# Patient Record
Sex: MALE | Race: WHITE
[De-identification: names, ages, dates, MRNs, and addresses within clinical notes are randomized per-mention and may not be internally consistent; named-entity substitution may affect disease eponyms.]

---

## 2017-12-09 ENCOUNTER — HOSPITAL ENCOUNTER (EMERGENCY)
Dept: HOSPITAL 80 - FED | Age: 42
LOS: 2 days | Discharge: HOME | End: 2017-12-11
Payer: MEDICAID

## 2017-12-09 DIAGNOSIS — F19.10: Primary | ICD-10-CM

## 2017-12-09 DIAGNOSIS — F17.200: ICD-10-CM

## 2017-12-09 PROCEDURE — G0480 DRUG TEST DEF 1-7 CLASSES: HCPCS

## 2017-12-09 NOTE — EDPHY
General





- History


Smoking Status: Current some day smoker


Narrative: 





0641AM:  No acute overnight.  Patient was sling.  Needs mental evaluation.  Was 

positive for methamphetamine.  Underlying schizophrenia. (Almas Pierre)





I assumed care of the patient at 0700.  The patient remained stable throughout 

my shift.  He was seen by Mental Health who currently is evaluating possible 

placement options.  The patient will be turned over to Dr. Pagan at shift 

change.





 (Kofi Pabon)





CHIEF COMPLAINT: 


M1 hold





HISTORY OF PRESENT ILLNESS: 


Patient arrives by EMS with [place] [employer] as well. [place] [employer] 

Department reports that the patient was sitting in the middle of the road with 

his belongings with him when he flagged the police department down.  PD reports 

that he was agitated, appeared to be hallucinating and admits to amphetamine 

use.  He was exhibiting flight of ideas, pressured speech and incoherent speech 

at times.  They elected to place him on an M1 hold due to what they are 

assuming are hallucinations.  It did require an hour to get him in the 

ambulance and transferred to the hospital.  He denies any complaints of any 

kind.





PSYCHIATRIC DIAGNOSES:


Schizophrenia per chart review





PRIOR PSYCHIATRIC EVALUATIONS:


Unknown





M1/DETAINER:


[place] [employer] at time of arrival





REVIEW OF SYSTEMS:


Ten systems reviewed and are negative unless otherwise noted in the HPI








EXAMINATION


General Appearance:  Alert, no distress, unkempt


Head: normocephalic, atraumatic


Eyes:  Pupils equal and round, no conjunctival pallor or injection.  Dilated 

pupils


ENT, Mouth:  Mucous membranes moist


Neck:  Normal inspection, supple, non-tender


Respiratory:  No retractions or distress.  Not cooperating auscultation


Cardiovascular:  Tachycardic rate.  Will not cooperate with auscultation


Gastrointestinal:  Abdomen is nondistended


Neurological:  Ambulate without difficulty.  Will answer my questions regarding 

person place or time.


Skin:  Grossly intact.


Extremities:  Nontender, no pedal edema


Psychiatric:  Pressured speech and flight of ideas.  Unable to carry on a 

conversation without changing his direction.  Denies suicidal ideation.  Denies 

homicidal ideation








DIFFERENTIAL DIAGNOSES:


Including but not limited to paranoid schizophrenia, schizoaffective, 

methamphetamine use, polysubstance abuse, acute psychosis








MDM:


4:05 p.m.


Likely paranoid schizophrenia with methamphetamine use.  The patient is 

difficult to examine as he will not cooperate fully.  I have elected to order 

Ativan and Haldol in attempt to help calm him down.





4:20 p.m.


Notified by RN that the patient took the Ativan ordered by mouth without 

difficulty.  Thus far cooperative but we are keeping a close eye on him.  

Haldol IM canceled.





5:45 p.m.


Patient's urine is positive for methamphetamine.  Thus he will not be evaluated 

within the next 12 hr.  He has thus far not cooperating with blood draws.  

Proceed with oral Zyprexa and attempt again.  At this time I will check the 

patient out to Dr. Pagan.  He will assume care the patient.  Please see his 

note for further details and disposition.











SUPERVISION: 


Patient was independently examined, but I discussed the case with my secondary 

supervising physician Dr. Pagan. (Braden Santo)


Discussion: 





Care was signed out to me at 6:00 p.m. by GURINDER perez.  At 6:20 p.m. patient 

becomes very agitated.  He is given 2 mg of Ativan IM as well as 10 mg of 

Haldol IM.  He does not have IV access.  He has already had Zyprexa when I 

review his medical record





We are able to get an IV in the patient while he was in 4 point restraints.  He 

was given 25 mg of Benadryl IV.  At 6:45 p.m. the patient is now sleeping.





Patient has remained stable. His care is transferred to Dr. Pierre at 11:00 

p.m. (Dakota Pagan)





2:00 p.m. the patient has been evaluated by Mental Health.  They have spoken 

with patient's mom and reviewed his medical records.  The patient has been 

admitted several times for psychotic type features but always in the setting of 

substance abuse.  He has always been discharged within a few days and his 

symptoms felt to be primarily due to substance abuse.  The patient admits to 

having taking all of his pill bottle of Adderall prior to coming to the 

emergency department.  He currently does not meet criteria for inpatient 

admission.  They did offer to send him to a rehabilitation center however he 

declines and does not want to stop using Adderall.  He is requesting to be sent 

to the shelter. (Marcus Mcfarland)





- Objective


Vital Signs: 


 Initial Vital Signs











Heart Rate  118 H  12/09/17 16:00


 


Respiratory Rate  20   12/09/17 16:00


 


Blood Pressure  155/90 H  12/09/17 16:00


 


O2 Sat (%)  98   12/09/17 16:00








 











O2 Delivery Mode               Room Air


 


O2 (L/minute)                  2














Allergies/Adverse Reactions: 


 





No Known Allergies Allergy (Verified 12/09/17 16:07)


 








Home Medications: 














 Medication  Instructions  Recorded


 


Adderall 10 mg Tablet  05/11/16


 


Clotrimazole 1% [Lotrimin 1%] 15 gm TP BID #15 gr 05/11/16


 


Clotrimazole 1% [Lotrimin 1%] 1 layton TP BID #30 cream 06/17/16


 


Ibuprofen [Motrin (*)] 800 mg PO Q6 #15 tab 06/17/16


 


Risperidone 1 mg PO HS #10 tablet 09/21/16











Laboratory Results: 


 Laboratory Results





 12/09/17 23:59 





 12/09/17 23:59 








Medications Given: 


 





Nicotine Polacrilex (Nicorette)  2 mg B PRN PRN


   PRN Reason: Nicotine Withdrawal


   Stop: 06/07/18 16:47


   Last Admin: 12/09/17 16:59 Dose:  2 mg





Discontinued Medications





Diphenhydramine HCl (Benadryl Injection)  25 mg IVP EDNOW ONE


   Stop: 12/09/17 18:37


   Last Admin: 12/09/17 18:37 Dose:  25 mg


Haloperidol Lactate (Haldol Injection)  5 mg IM EDNOW ONE


   Stop: 12/09/17 16:14


   Last Admin: 12/09/17 18:08 Dose:  Not Given


Lorazepam (Ativan)  2 mg PO EDNOW ONE


   Stop: 12/09/17 16:09


   Last Admin: 12/09/17 16:30 Dose:  2 mg


Lorazepam (Ativan Injection)  2 mg IM EDNOW ONE


   Stop: 12/09/17 16:13


   Last Admin: 12/09/17 18:08 Dose:  Not Given


Nicotine (Nicoderm Cq)  21 mg TD EDNOW ONE


   Stop: 12/09/17 16:48


   Last Admin: 12/09/17 16:59 Dose:  21 mg


Olanzapine (Zyprexa Zydis)  10 mg PO EDNOW ONE


   Stop: 12/09/17 16:31


   Last Admin: 12/09/17 16:25 Dose:  10 mg


Olanzapine (Olanzapine)  5 mg PO ONCE ONE


   Stop: 12/09/17 17:45


   Last Admin: 12/09/17 18:04 Dose:  Not Given


Olanzapine (Zyprexa Zydis)  10 mg PO EDNOW ONE


   Stop: 12/09/17 17:48


   Last Admin: 12/09/17 18:04 Dose:  10 mg








Departure





- Departure


Disposition: Home, Routine, Self-Care


Clinical Impression: 


 Polysubstance abuse





Condition: Fair


Instructions:  Polysubstance Abuse (ED)


Referrals: 


NONE *PRIMARY CARE P,. [Primary Care Provider] - As per Instructions


[facility],. [Clinic] - As per Instructions

## 2017-12-10 LAB
% IMMATURE GRANULYOCYTES: 0.4 % (ref 0–1.1)
ABSOLUTE IMMATURE GRANULOCYTES: 0.04 10^3/UL (ref 0–0.1)
ABSOLUTE NRBC COUNT: 0 10^3/UL (ref 0–0.01)
ADD DIFF?: NO
ADD MORPH?: NO
ADD SCAN?: NO
ANION GAP SERPL CALC-SCNC: 11 MEQ/L (ref 8–16)
ATYPICAL LYMPHOCYTE FLAG: 0 (ref 0–99)
CALCIUM SERPL-MCNC: 8.6 MG/DL (ref 8.5–10.4)
CHLORIDE SERPL-SCNC: 109 MEQ/L (ref 97–110)
CO2 SERPL-SCNC: 24 MEQ/L (ref 22–31)
CREAT SERPL-MCNC: 0.7 MG/DL (ref 0.7–1.3)
ERYTHROCYTE [DISTWIDTH] IN BLOOD BY AUTOMATED COUNT: 12.6 % (ref 11.5–15.2)
ETHANOL SERPL-MCNC: < 10 MG/DL (ref 0–10)
FRAGMENT RBC FLAG: 0 (ref 0–99)
GFR SERPL CREATININE-BSD FRML MDRD: > 60 ML/MIN/{1.73_M2}
GLUCOSE SERPL-MCNC: 97 MG/DL (ref 70–100)
HCT VFR BLD CALC: 35.6 % (ref 40–51)
HGB BLD-MCNC: 12.5 G/DL (ref 13.7–17.5)
LEFT SHIFT FLG: 0 (ref 0–99)
LIPEMIA HEMOLYSIS FLAG: 90 (ref 0–99)
MCH RBC BLDCO QN: 31.3 PG (ref 27.9–34.1)
MCHC RBC AUTO-ENTMCNC: 35.1 G/DL (ref 32.4–36.7)
MCV RBC AUTO: 89.2 FL (ref 81.5–99.8)
NRBC-AUTO%: 0 % (ref 0–0.2)
PLATELET # BLD: 168 10^3/UL (ref 150–400)
PLATELET CLUMPS FLAG: 10 (ref 0–99)
PMV BLD AUTO: 10.4 FL (ref 8.7–11.7)
POTASSIUM SERPL-SCNC: 4.5 MEQ/L (ref 3.5–5.2)
RBC # BLD AUTO: 3.99 10^6/UL (ref 4.4–6.38)
SALICYLATES SERPL-MCNC: < 1 MG/DL (ref 2–20)
SODIUM SERPL-SCNC: 144 MEQ/L (ref 134–144)

## 2017-12-11 VITALS
RESPIRATION RATE: 18 BRPM | OXYGEN SATURATION: 96 % | HEART RATE: 79 BPM | SYSTOLIC BLOOD PRESSURE: 132 MMHG | DIASTOLIC BLOOD PRESSURE: 69 MMHG

## 2017-12-11 VITALS — TEMPERATURE: 97.3 F

## 2017-12-11 NOTE — ASDISCHSUM
----------------------------------------------

Discharge Information

----------------------------------------------

Plan Status:Homeless/Shelter                         Medically Cleared to Leave:

Discharge Date:12/11/2017 02:39 PM                   CM D/C Disposition:Stre[facility] (Homeless)

ADT D/C Disposition:Home, Routine, Self-Care         Projected Discharge Date:12/11/2017 02:39 PM

Transportation at D/C:Taxicab                        Discharge Delay Reason:

Follow-Up Date:12/11/2017 02:39 PM                   Discharge Slot:

Final Diagnosis:

----------------------------------------------

Placement Information

----------------------------------------------

----------------------------------------------

Patient Contact Information

----------------------------------------------

Contact Name:LENNY                               Relationship:

Address:                                             Home Phone:

                                                     Work Phone:

City:                                                Alternate Phone:

State/Zip Code:                                      Email:

----------------------------------------------

Financial Information

----------------------------------------------

Financial Class:MD

Primary Plan Desc:[employer] [place]        Primary Plan Number:[id number]

Secondary Plan Desc:                                 Secondary Plan Number:

 

 

----------------------------------------------

Assessment Information

----------------------------------------------

----------------------------------------------

[facility] CM Progress Note

----------------------------------------------

CM Note

 

CM Note                       

Notes:

Patient was evaluated by EPS and his M1 hold was lifted, their discharge plan is to cab him to the 

[facility] location in order to get set up with the local shelter and other homeless 

resource options. This CM offered to assist RN with providing and explaining the CE information and 


calling the cab. This CM spoke with the patient and he says he agrees with discharge plan and 

understands the importance of completing CE; information provided. Cab called arranged. CM 

available for further assistance. 

 

Date Signed:  12/11/2017 04:49 PM

Electronically Signed By:Raven Butler RN

 

 

----------------------------------------------

LACE

----------------------------------------------

LACE

 

Acuity / Level of Care        Answers:  No.                                   

Emergency dept visits in      Answers:  1                                     

last 6 months                                                                 

Score: 1

 

Date Signed:  12/11/2017 04:49 PM

Electronically Signed By:Raven Butler RN

 

 

----------------------------------------------

Intervention Information

----------------------------------------------

Intervention Type:Cab Vouchers                       Date of Service:12/11/2017 04:49 PM

Patient Type:Emergency Room                          Staff Member:BETH Butler Sharon

Hours:0.25                                           Discipline:

Severity:                                            Comment:Cab arranged and voucher provided by SURESH ESCOTO/EPS

Intervention Type:Community Resources                Date of Service:12/11/2017 04:49 PM

Patient Type:Emergency Room                          Staff Member:BETH Butler Sharon

Hours:0.25                                           Discipline:

Severity:                                            Comment:Provided information on [facility]
try and 

                                                              other homeless resources.

## 2017-12-11 NOTE — ASMTCMCOM
CM Note

 

CM Note                       

Notes:

Patient was evaluated by EPS and his M1 hold was lifted, their discharge plan is to cab him to the 

[facility] location in order to get set up with the local shelter and other homeless 

resource options. This CM offered to assist RN with providing and explaining the CE information and 


calling the cab. This CM spoke with the patient and he says he agrees with discharge plan and 

understands the importance of completing CE; information provided. Cab called arranged. CM 

available for further assistance. 

 

Date Signed:  12/11/2017 04:49 PM

Electronically Signed By:Raven Butler RN

## 2018-01-26 ENCOUNTER — HOSPITAL ENCOUNTER (EMERGENCY)
Dept: HOSPITAL 80 - FED | Age: 43
Discharge: PSYCHIATRIC OR SUBSTANCE USE FACILITY | End: 2018-01-26
Payer: MEDICAID

## 2018-01-26 VITALS
DIASTOLIC BLOOD PRESSURE: 67 MMHG | SYSTOLIC BLOOD PRESSURE: 120 MMHG | TEMPERATURE: 98.2 F | HEART RATE: 85 BPM | RESPIRATION RATE: 16 BRPM

## 2018-01-26 VITALS — OXYGEN SATURATION: 95 %

## 2018-01-26 DIAGNOSIS — F23: Primary | ICD-10-CM

## 2018-01-26 DIAGNOSIS — F17.200: ICD-10-CM

## 2018-01-26 LAB — PLATELET # BLD: 242 10^3/UL (ref 150–400)

## 2018-01-26 PROCEDURE — G0480 DRUG TEST DEF 1-7 CLASSES: HCPCS

## 2018-01-26 NOTE — EDPHY
H & P


Stated Complaint: Brought to emergency department on M1 psychiatric hold


Source: Patient


Exam Limitations: No limitations





- Personal History


Current Tetanus Diphtheria and Acellular Pertussis (TDAP): Yes


Tetanus Vaccine Date: < 10 years





- Medical/Surgical History


Hx Asthma: No


Hx Chronic Respiratory Disease: No


Hx Diabetes: No


Hx Cardiac Disease: No


Hx Renal Disease: No


Hx Cirrhosis: No


Hx Alcoholism: No


Hx HIV/AIDS: No


Hx Splenectomy or Spleen Trauma: No


Other PMH: PMH: ADHD, depression, bipolar, schizoaffective d/o.  PSH:





- Social History


Smoking Status: Current some day smoker


Time Seen by Provider: 01/26/18 11:06


HPI/ROS: 





CHIEF COMPLAINT:  Agitated, brought in on M1 psychiatric hold





HISTORY OF PRESENT ILLNESS:  The patient presents the emergency department 

after he was placed on an M1 psychiatric hold at the crisis Center.  The 

patient has a history of schizophrenia.  He reportedly was lying in the street 

with his possessions which prompted police to pick him up.  He has a history of 

this type of behavior.  The patient reports that he is only using Adderall 

sporadically.  He denies taking additional psychiatric medication and 

specifically refuses antipsychotics.  The patient was seen in the emergency 

department in December with a very identical presentation.  After prolonged 

period of observation he was discharged to the street.  The patient denies any 

suicidal or homicidal ideation.  He does report chronic visual hallucinations.  

The patient denies any ingestion.





REVIEW OF SYSTEMS:


A comprehensive 10 point review of systems is otherwise negative aside from 

elements mentioned in the history of present illness.





 (Kofi Pabon)





- Physical Exam


Exam: 





General Appearance:  Alert, no distress


Eyes:  Pupils equal and round no pallor or injection


ENT, Mouth:  Mucous membranes moist


Respiratory:  There are no retractions, lungs are clear to auscultation


Cardiovascular:  Regular rate and rhythm


Gastrointestinal:  Abdomen is soft and nontender, no masses, bowel sounds normal


Neurological:  A&O, normal motor function, normal sensory exam, normal cranial 

nerves


Skin:  Warm and dry, no rashes


Musculoskeletal:  Neck is supple nontender


Extremities:  symmetrical, full range of motion


Psychiatric:  Alert and oriented x3, grandiose, endorses visual hallucinations





 (Kofi Pabon)


Constitutional: 


 Initial Vital Signs











Temperature (C)  36.6 C   01/26/18 10:49


 


Heart Rate  64   01/26/18 10:49


 


Respiratory Rate  16   01/26/18 10:49


 


Blood Pressure  128/60 H  01/26/18 10:49


 


O2 Sat (%)  98   01/26/18 10:49








 











O2 Delivery Mode               Room Air














Allergies/Adverse Reactions: 


 





No Known Allergies Allergy (Verified 12/09/17 16:07)


 








Home Medications: 














 Medication  Instructions  Recorded


 


Adderall 10 mg Tablet  05/11/16


 


Clotrimazole 1% [Lotrimin 1%] 15 gm TP BID #15 gr 05/11/16


 


Clotrimazole 1% [Lotrimin 1%] 1 layton TP BID #30 cream 06/17/16


 


Ibuprofen [Motrin (*)] 800 mg PO Q6 #15 tab 06/17/16


 


Risperidone 1 mg PO HS #10 tablet 09/21/16














Medical Decision Making


ED Course/Re-evaluation: 





The patient arrives to the emergency department on an M1 psychiatric hold.  The 

patient was offered Zyprexa and declined.  Screening laboratory studies have 

been sent.  The patient has been medically cleared for psychiatric evaluation.





3:00 p.m.:  Patient will be turned over to Dr. Alpa Holder pending 

psychiatric evaluation and disposition.





 (Kofi Pabon)





1500:  Patient is signed out to me at change of shift.  The patient is stable.





Psychiatric Services evaluated the patient.  They felt the patient should be 

placed.  The patient was informed of the plan.





1805:  I again discussed the plan with the patient.  I answered all his 

questions.





2300:  The patient is signed out to Dr. Matthews at change of shift. (Alpa Holder)


Differential Diagnosis: 





Differential diagnosis considered includes schizophrenia, psychosis, drug 

intoxication, withdrawal (Kofi Pabon)





- Data Points


Laboratory Results: 


 Laboratory Results





 01/26/18 11:45 





 01/26/18 11:45 





 











  01/26/18 01/26/18 01/26/18





  11:45 11:45 11:45


 


WBC      3.61 10^3/uL L 10^3/uL





     (3.80-9.50) 


 


RBC      4.52 10^6/uL 10^6/uL





     (4.40-6.38) 


 


Hgb      14.2 g/dL g/dL





     (13.7-17.5) 


 


Hct      40.9 % %





     (40.0-51.0) 


 


MCV      90.5 fL fL





     (81.5-99.8) 


 


MCH      31.4 pg pg





     (27.9-34.1) 


 


MCHC      34.7 g/dL g/dL





     (32.4-36.7) 


 


RDW      12.4 % %





     (11.5-15.2) 


 


Plt Count      242 10^3/uL 10^3/uL





     (150-400) 


 


MPV      9.6 fL fL





     (8.7-11.7) 


 


Neut % (Auto)      63.4 % %





     (39.3-74.2) 


 


Lymph % (Auto)      24.4 % %





     (15.0-45.0) 


 


Mono % (Auto)      9.1 % %





     (4.5-13.0) 


 


Eos % (Auto)      1.7 % %





     (0.6-7.6) 


 


Baso % (Auto)      1.1 % %





     (0.3-1.7) 


 


Nucleat RBC Rel Count      0.0 % %





     (0.0-0.2) 


 


Absolute Neuts (auto)      2.29 10^3/uL 10^3/uL





     (1.70-6.50) 


 


Absolute Lymphs (auto)      0.88 10^3/uL L 10^3/uL





     (1.00-3.00) 


 


Absolute Monos (auto)      0.33 10^3/uL 10^3/uL





     (0.30-0.80) 


 


Absolute Eos (auto)      0.06 10^3/uL 10^3/uL





     (0.03-0.40) 


 


Absolute Basos (auto)      0.04 10^3/uL 10^3/uL





     (0.02-0.10) 


 


Absolute Nucleated RBC      0.00 10^3/uL 10^3/uL





     (0-0.01) 


 


Immature Gran %      0.3 % %





     (0.0-1.1) 


 


Immature Gran #      0.01 10^3/uL 10^3/uL





     (0.00-0.10) 


 


Sodium    143 mEq/L mEq/L  





    (135-145)  


 


Potassium    4.6 mEq/L mEq/L  





    (3.5-5.2)  


 


Chloride    105 mEq/L mEq/L  





    ()  


 


Carbon Dioxide    28 mEq/l mEq/l  





    (22-31)  


 


Anion Gap    10 mEq/L mEq/L  





    (8-16)  


 


BUN    14 mg/dL mg/dL  





    (7-23)  


 


Creatinine    0.8 mg/dL mg/dL  





    (0.7-1.3)  


 


Estimated GFR    > 60   





    


 


Glucose    105 mg/dL H mg/dL  





    ()  


 


Calcium    9.3 mg/dL mg/dL  





    (8.5-10.4)  


 


Urine Opiates Screen  NEGATIVE     





   (NEGATIVE)   


 


Urine Barbiturates  NEGATIVE     





   (NEGATIVE)   


 


Ur Phencyclidine Scrn  NEGATIVE     





   (NEGATIVE)   


 


Ur Amphetamine Screen  NEGATIVE     





   (NEGATIVE)   


 


U Benzodiazepines Scrn  NEGATIVE     





   (NEGATIVE)   


 


Urine Cocaine Screen  NEGATIVE     





   (NEGATIVE)   


 


U Marijuana (THC) Screen  NON-NEGATIVE  H     





   (NEGATIVE)   


 


Ethyl Alcohol    < 10 mg/dL mg/dL  





    (0-10)  











Medications Given: 


 





Nicotine Polacrilex (Nicorette)  6 mg B PRN PRN


   PRN Reason: Nicotine Withdrawal


   Stop: 07/25/18 10:54


   Last Admin: 01/26/18 11:26 Dose:  6 mg





Discontinued Medications





Nicotine (Nicoderm Cq)  21 mg TD EDNOW ONE


   Stop: 01/26/18 10:56


   Last Admin: 01/26/18 12:31 Dose:  21 mg








Departure





- Departure


Disposition: Other Psych, Not [place]


Clinical Impression: 


 Acute psychosis, Hallucinations





Condition: Good


Referrals: 


Patient,NotPresent [Unknown] - As per Instructions

## 2018-02-16 ENCOUNTER — HOSPITAL ENCOUNTER (EMERGENCY)
Dept: HOSPITAL 80 - FED | Age: 43
LOS: 1 days | Discharge: HOME | End: 2018-02-17
Payer: MEDICAID

## 2018-02-16 DIAGNOSIS — F15.10: Primary | ICD-10-CM

## 2018-02-16 DIAGNOSIS — E86.9: ICD-10-CM

## 2018-02-16 LAB — PLATELET # BLD: 196 10^3/UL (ref 150–400)

## 2018-02-16 NOTE — EDPHY
HPI/HX/ROS/PE/MDM





<Ramón Adkins - Last Filed: 02/16/18 20:29>





<Alpa Holder S - Last Filed: 02/16/18 22:46>





- Data Points


Imaging: I viewed and interpreted images myself





<Almas Pierre - Last Filed: 02/17/18 05:21>


Narrative: 





CHIEF COMPLAINT: Methamphetamine use 





HPI: 





This patient is a 43 y/o male with history of psychosis arriving via EMS with 

police escort for evaluation after methamphetamine use. Per police report, the 

patient called 911 himself after experiencing auditory and visual 

hallucinations. On EMS arrival, the patient was combative and arrives in 

restraints. 5mg IM Versed administered in transport. The patient was placed on 

oxygen by nasal cannula as he was so talkative and agitated that his oxygen 

saturation became too low. He was tachycardic as well. The patient admits to 

methamphetamine use today. He has disorganized thoughts and repeatedly talks 

about drug use, a "cold war", and a "pistachio building", among other statements

, and requests that we call his mother. 





HPI primarily obtained from police and paramedic at bedside. Further HPI 

unobtainable due to patient presentation. 





REVIEW OF SYSTEMS:


Aside from elements discussed in the HPI, a comprehensive 10-point review of 

systems was reviewed and is negative.





PMH: ADHD, Depression, Bipolar, Schizoaffective disorder 





SOCIAL HISTORY: Homeless. Lives in [place]. 





PHYSICAL EXAM:


General:Patient is awake, agitated, yelling.


Head: Atraumatic, normocephalic


ENT:Eyes are normal to inspection.  ENT inspection normal.


Neck: Normal inspection.  Full range of motion.


Respiratory:No respiratory distress.  No stridor.


Skin: Normal color.  No rash.  Warm and dry.


Extremities: Normal appearance.  Full range of motion.


Neuro: Normal motor function.  Normal sensory function.


Psychiatric: Disorganized.





 (Ramón Adkins)





2100:  The patient is signed out to me at change of shift by Dr. Adkins.  I 

reviewed the case with Dr. Adkins.  I reviewed the patient's laboratory 

studies.  He is noted [place] [address]. Patient is 

receiving normal saline.  





2245: Patient was stable during his stay.  He was signed out to Dr. Pierre at 

change of shift. (Alpa Holder)


ED Course: 





18:42 [facility] at bedside. Multiple police officers and firefighters at bedside. 

Patient was combative in transport and is currently in restraints. Plan to 

administer 10mg IM Haldol and 2mg IM Ativan.  (Ramón Adkins)





0520:  Patient ambulated well throughout the emergency room.  P.o. Challenge 

well he has no complaints.  He is sober.  Answers my questions appropriately.  

Does not want hurt himself or anybody else.  He is requesting go to a homeless 

shelter today.  He is positive for methamphetamine the setting of underlying 

mental illness.  However he does not appear gravely disabled he does not want 

hurt himself or anybody else.  He is not on M1 hold.  He came in acutely 

psychotic after doing methamphetamine.  Explain he should refrain from doing 

methamphetamine.  He feels comfortable being discharged.  Questions been 

answered. (Almas Pierre)





- Data Points


Laboratory Results: 


 Laboratory Results





 02/17/18 04:00 





 02/17/18 04:00 





 











  02/17/18 02/17/18 02/17/18





  04:00 04:00 03:16


 


WBC    11.59 10^3/uL H D 10^3/uL  





    (3.80-9.50)  


 


RBC    4.05 10^6/uL L 10^6/uL  





    (4.40-6.38)  


 


Hgb    12.5 g/dL L g/dL  





    (13.7-17.5)  


 


Hct    37.0 % L %  





    (40.0-51.0)  


 


MCV    91.4 fL fL  





    (81.5-99.8)  


 


MCH    30.9 pg pg  





    (27.9-34.1)  


 


MCHC    33.8 g/dL g/dL  





    (32.4-36.7)  


 


RDW    13.0 % %  





    (11.5-15.2)  


 


Plt Count    167 10^3/uL 10^3/uL  





    (150-400)  


 


MPV    10.3 fL fL  





    (8.7-11.7)  


 


Neut % (Auto)    81.5 % H %  





    (39.3-74.2)  


 


Lymph % (Auto)    9.7 % L %  





    (15.0-45.0)  


 


Mono % (Auto)    7.4 % %  





    (4.5-13.0)  


 


Eos % (Auto)    0.8 % %  





    (0.6-7.6)  


 


Baso % (Auto)    0.3 % %  





    (0.3-1.7)  


 


Nucleat RBC Rel Count    0.0 % %  





    (0.0-0.2)  


 


Absolute Neuts (auto)    9.44 10^3/uL H 10^3/uL  





    (1.70-6.50)  


 


Absolute Lymphs (auto)    1.12 10^3/uL 10^3/uL  





    (1.00-3.00)  


 


Absolute Monos (auto)    0.86 10^3/uL H 10^3/uL  





    (0.30-0.80)  


 


Absolute Eos (auto)    0.09 10^3/uL 10^3/uL  





    (0.03-0.40)  


 


Absolute Basos (auto)    0.04 10^3/uL 10^3/uL  





    (0.02-0.10)  


 


Absolute Nucleated RBC    0.00 10^3/uL 10^3/uL  





    (0-0.01)  


 


Immature Gran %    0.3 % %  





    (0.0-1.1)  


 


Immature Gran #    0.04 10^3/uL 10^3/uL  





    (0.00-0.10)  


 


Sodium  140 mEq/L mEq/L    





   (135-145)   


 


Potassium  4.2 mEq/L mEq/L    





   (3.5-5.2)   


 


Chloride  105 mEq/L mEq/L    





   ()   


 


Carbon Dioxide  25 mEq/l mEq/l    





   (22-31)   


 


Anion Gap  10 mEq/L mEq/L    





   (8-16)   


 


BUN  25 mg/dL H mg/dL    





   (7-23)   


 


Creatinine  0.7 mg/dL mg/dL    





   (0.7-1.3)   


 


Estimated GFR  > 60     





    


 


Glucose  92 mg/dL mg/dL    





   ()   


 


Calcium  8.3 mg/dL L mg/dL    





   (8.5-10.4)   


 


Urine Opiates Screen      NEGATIVE 





     (NEGATIVE) 


 


Urine Barbiturates      NEGATIVE 





     (NEGATIVE) 


 


Ur Phencyclidine Scrn      NEGATIVE 





     (NEGATIVE) 


 


Ur Amphetamine Screen      NON-NEGATIVE  H 





     (NEGATIVE) 


 


U Benzodiazepines Scrn      NEGATIVE 





     (NEGATIVE) 


 


Urine Cocaine Screen      NEGATIVE 





     (NEGATIVE) 


 


U Marijuana (THC) Screen      NEGATIVE 





     (NEGATIVE) 














  02/16/18 02/16/18





  20:16 20:16


 


WBC    19.64 10^3/uL H 10^3/uL





    (3.80-9.50) 


 


RBC    4.47 10^6/uL 10^6/uL





    (4.40-6.38) 


 


Hgb    13.7 g/dL g/dL





    (13.7-17.5) 


 


Hct    39.9 % L %





    (40.0-51.0) 


 


MCV    89.3 fL fL





    (81.5-99.8) 


 


MCH    30.6 pg pg





    (27.9-34.1) 


 


MCHC    34.3 g/dL g/dL





    (32.4-36.7) 


 


RDW    12.9 % %





    (11.5-15.2) 


 


Plt Count    196 10^3/uL 10^3/uL





    (150-400) 


 


MPV    10.1 fL fL





    (8.7-11.7) 


 


Neut % (Auto)    92.1 % H %





    (39.3-74.2) 


 


Lymph % (Auto)    2.7 % L %





    (15.0-45.0) 


 


Mono % (Auto)    4.5 % %





    (4.5-13.0) 


 


Eos % (Auto)    0.0 % L %





    (0.6-7.6) 


 


Baso % (Auto)    0.3 % %





    (0.3-1.7) 


 


Nucleat RBC Rel Count    0.0 % %





    (0.0-0.2) 


 


Absolute Neuts (auto)    18.09 10^3/uL H 10^3/uL





    (1.70-6.50) 


 


Absolute Lymphs (auto)    0.53 10^3/uL L 10^3/uL





    (1.00-3.00) 


 


Absolute Monos (auto)    0.88 10^3/uL H 10^3/uL





    (0.30-0.80) 


 


Absolute Eos (auto)    0.00 10^3/uL L 10^3/uL





    (0.03-0.40) 


 


Absolute Basos (auto)    0.06 10^3/uL 10^3/uL





    (0.02-0.10) 


 


Absolute Nucleated RBC    0.00 10^3/uL 10^3/uL





    (0-0.01) 


 


Immature Gran %    0.4 % %





    (0.0-1.1) 


 


Immature Gran #    0.08 10^3/uL 10^3/uL





    (0.00-0.10) 


 


Sodium  142 mEq/L mEq/L  





   (135-145)  


 


Potassium  4.3 mEq/L mEq/L  





   (3.5-5.2)  


 


Chloride  104 mEq/L mEq/L  





   ()  


 


Carbon Dioxide  21 mEq/l L mEq/l  





   (22-31)  


 


Anion Gap  17 mEq/L H mEq/L  





   (8-16)  


 


BUN  29 mg/dL H mg/dL  





   (7-23)  


 


Creatinine  1.0 mg/dL mg/dL  





   (0.7-1.3)  


 


Estimated GFR  > 60   





   


 


Glucose  103 mg/dL H mg/dL  





   ()  


 


Calcium  9.7 mg/dL mg/dL  





   (8.5-10.4)  


 


Urine Opiates Screen    





   


 


Urine Barbiturates    





   


 


Ur Phencyclidine Scrn    





   


 


Ur Amphetamine Screen    





   


 


U Benzodiazepines Scrn    





   


 


Urine Cocaine Screen    





   


 


U Marijuana (THC) Screen    





   











Medications Given: 


 








Discontinued Medications





Haloperidol Lactate (Haldol Injection)  10 mg IM EDNOW ONE


   Stop: 02/16/18 18:53


   Last Admin: 02/16/18 19:00 Dose:  10 mg


Sodium Chloride (Ns)  1,000 mls @ 0 mls/hr IV EDNOW ONE; Wide Open


   PRN Reason: Protocol


   Stop: 02/16/18 21:03


   Last Admin: 02/16/18 21:30 Dose:  1,000 mls


Sodium Chloride (Ns)  1,000 mls @ 0 mls/hr IV ONCE ONE


   PRN Reason: Wide Open


   Stop: 02/17/18 02:41


   Last Admin: 02/17/18 02:46 Dose:  1,000 mls


Lorazepam (Ativan Injection)  2 mg IM EDNOW ONE


   Stop: 02/16/18 18:53


   Last Admin: 02/16/18 19:00 Dose:  2 mg








General





<Ramón Adkins M - Last Filed: 02/16/18 20:29>





<Alpa Holder S - Last Filed: 02/16/18 22:46>





<Almas Pierre - Last Filed: 02/17/18 05:21>


Initial Vital Signs: 


 Initial Vital Signs











Temperature (C)  36.6 C   02/16/18 19:00


 


Heart Rate  111 H  02/16/18 19:00


 


Respiratory Rate  20   02/16/18 19:00


 


Blood Pressure  149/88 H  02/16/18 19:00


 


O2 Sat (%)  94   02/16/18 19:00








 











O2 Delivery Mode               Room Air


 


O2 (L/minute)                  2














Allergies/Adverse Reactions: 


 





No Known Allergies Allergy (Verified 12/09/17 16:07)


 








Home Medications: 














 Medication  Instructions  Recorded


 


Adderall 10 mg Tablet  05/11/16


 


Clotrimazole 1% [Lotrimin 1%] 15 gm TP BID #15 gr 05/11/16


 


Clotrimazole 1% [Lotrimin 1%] 1 layton TP BID #30 cream 06/17/16


 


Ibuprofen [Motrin (*)] 800 mg PO Q6 #15 tab 06/17/16


 


Risperidone 1 mg PO HS #10 tablet 09/21/16














Departure





<Ramón Adkins - Last Filed: 02/16/18 20:29>





<Alpa Holder - Last Filed: 02/16/18 22:46>





<Almas Pierre - Last Filed: 02/17/18 05:21>





- Departure


Disposition: Home, Routine, Self-Care


Clinical Impression: 


 Methamphetamine abuse





Condition: Good


Instructions:  Methamphetamine Abuse (ED)


Referrals: 


Patient,NotPresent [Primary Care Provider] - As per Instructions


Report Scribed for: Ramón Adkins


Report Scribed by: Macrina Costello


Date of Report: 02/16/18


Time of Report: 18:53


Physician Review and Approval Statement: Portions of this note were transcribed 

by an ED scribe.  I personally performed the history, physical exam, and 

medical decision making; and confirm the accuracy of the information in the 

transcribed note.





<Ramón Adkins - Last Filed: 02/16/18 20:29>

## 2018-02-17 VITALS
HEART RATE: 70 BPM | TEMPERATURE: 97.7 F | DIASTOLIC BLOOD PRESSURE: 78 MMHG | OXYGEN SATURATION: 96 % | RESPIRATION RATE: 20 BRPM | SYSTOLIC BLOOD PRESSURE: 135 MMHG

## 2018-02-17 LAB — PLATELET # BLD: 167 10^3/UL (ref 150–400)

## 2018-03-06 ENCOUNTER — HOSPITAL ENCOUNTER (INPATIENT)
Dept: HOSPITAL 80 - FED | Age: 43
LOS: 2 days | Discharge: HOME | DRG: 896 | End: 2018-03-08
Attending: INTERNAL MEDICINE | Admitting: HOSPITALIST
Payer: MEDICAID

## 2018-03-06 DIAGNOSIS — E87.6: ICD-10-CM

## 2018-03-06 DIAGNOSIS — R09.2: ICD-10-CM

## 2018-03-06 DIAGNOSIS — F25.0: ICD-10-CM

## 2018-03-06 DIAGNOSIS — E87.2: ICD-10-CM

## 2018-03-06 DIAGNOSIS — E87.0: ICD-10-CM

## 2018-03-06 DIAGNOSIS — F17.210: ICD-10-CM

## 2018-03-06 DIAGNOSIS — F15.121: Primary | ICD-10-CM

## 2018-03-06 DIAGNOSIS — F12.90: ICD-10-CM

## 2018-03-06 LAB
CK SERPL-CCNC: 1151 IU/L (ref 0–224)
PLATELET # BLD: 241 10^3/UL (ref 150–400)

## 2018-03-06 PROCEDURE — G0480 DRUG TEST DEF 1-7 CLASSES: HCPCS

## 2018-03-06 RX ADMIN — DEXTROSE MONOHYDRATE AND SODIUM CHLORIDE SCH MLS: 5; .45 INJECTION, SOLUTION INTRAVENOUS at 17:40

## 2018-03-06 RX ADMIN — DEXTROSE MONOHYDRATE AND SODIUM CHLORIDE SCH MLS: 5; .45 INJECTION, SOLUTION INTRAVENOUS at 23:56

## 2018-03-06 NOTE — CPEKG
Heart Rate: 87

RR Interval: 690

P-R Interval: 152

QRSD Interval: 98

QT Interval: 404

QTC Interval: 486

P Axis: 70

QRS Axis: 74

T Wave Axis: 54

EKG Severity - ABNORMAL ECG -

EKG Impression: SINUS RHYTHM

EKG Impression: MEREDITH, CONSIDER BIATRIAL ABNORMALITIES

EKG Impression: LEFT VENTRICULAR HYPERTROPHY

EKG Impression: BORDERLINE PROLONGED QT INTERVAL

Electronically Signed By: Dale Limon 07-Mar-2018 09:22:36

## 2018-03-06 NOTE — CPEKG
Heart Rate: 97

RR Interval: 619

P-R Interval: 160

QRSD Interval: 102

QT Interval: 376

QTC Interval: 478

P Axis: 76

QRS Axis: 83

T Wave Axis: 58

EKG Severity - ABNORMAL ECG -

EKG Impression: SINUS RHYTHM

EKG Impression: BIATRIAL ABNORMALITIES

EKG Impression: LEFT VENTRICULAR HYPERTROPHY

EKG Impression: ST ELEV, PROBABLE NORMAL EARLY REPOL PATTERN

EKG Impression: BORDERLINE PROLONGED QT INTERVAL

Electronically Signed By: Kofi Pabon 06-Mar-2018 14:40:45

## 2018-03-06 NOTE — CPEKG
Heart Rate: 78

RR Interval: 769

P-R Interval: 156

QRSD Interval: 104

QT Interval: 428

QTC Interval: 488

P Axis: 77

QRS Axis: 86

T Wave Axis: 68

EKG Severity - BORDERLINE ECG -

EKG Impression: SINUS RHYTHM

EKG Impression: PROBABLE LEFT ATRIAL ABNORMALITY

EKG Impression: BORDERLINE PROLONGED QT INTERVAL

Electronically Signed By: Lionel Canales 07-Mar-2018 11:50:20

## 2018-03-06 NOTE — GHP
[f 
rep st]



                                                            HISTORY AND PHYSICAL





DATE OF ADMISSION:  03/06/2018



CHIEF COMPLAINT:  Agitation, history of methamphetamine abuse.



HISTORY OF PRESENT ILLNESS:  The patient is a 42-year-old male with a history 
of schizoaffective and possible bipolar disorder as well as history of IV 
methamphetamine abuse who was brought to the emergency department by police 
after he was found in an agitated state running through traffic.  



Upon arrival to the ED, he was placed on an M1 hold.  Soon after arriving to 
his psych room, he was witnessed to have a respiratory arrest.  He was 
reportedly agitated and in handcuffs at the time.  He was being physically 
restrained when he suddenly became cyanotic and his nurse was unable to find a 
palpable pulse.  A code blue was called.  He was transferred to the ED trauma 
[place], but prior to getting him on the telemetry monitor he had return of 
spontaneous circulation.  It is estimated he spent 30-60 seconds apneic and 
pulseless.  He then abruptly woke up and became severely agitated, requiring 
pharmacologic restraints in the form of 10 mg of IV Haldol and 2 mg of IV 
Ativan.  



He is currently somnolent and snoring during my exam with stable vital signs.  
Initial EKG shows possible ST elevation in his anterior leads, which may be a 
repolarization abnormality.  Repeat EKG is currently pending.  Initial troponin 
is negative.  Urine drug screen is pending.  The patient is admitted to the 
intensive care unit for further management.



PAST MEDICAL HISTORY:  

1.  History of schizophrenia versus schizoaffective disorder.

2.  History of IV drug abuse with prior urine drug screen positive for 
methamphetamine and marijuana.

3.  Possible bipolar disorder.

4.  History of depression.

5.  Possible history of attention deficit hyperactivity disorder.



FAMILY HISTORY:  Unobtainable.



SOCIAL HISTORY:  Unobtainable.  Per chart review, he has a tobacco history and 
as above, history of IV drug abuse.  His current living situation is unknown.



REVIEW OF SYSTEMS:  Unobtainable.



PHYSICAL EXAMINATION:  VITAL SIGNS:  Temperature is 38.1, blood pressure 146/90
, heart rate 91, respiratory rate 20.  He is 95% on 2 L by nasal cannula.  
GENERAL:  The patient is sedated and somnolent, arouses to sternal rub.  HEENT:
  Head is atraumatic, normocephalic.  He has dried blood around his mouth, 
possibly from his nose.  Pupils are equal, round, and reactive to light.  
Mucous membranes are dry.  NECK:  Supple.  There is no JVD.  HEART:  Regular 
rate and rhythm without murmur.  LUNGS:  Clear to auscultation bilaterally.  
ABDOMEN:  Soft, nondistended, nontender with normoactive bowel sounds.  
EXTREMITIES:  Distal bilateral upper extremities are reddened and swollen, 
possibly from struggling to get out of tight physical restraints in place.  
These were slightly loosened.  NEUROLOGIC: Patient is sedated but arouses to 
painful stimuli.  Moves all 4 extremities.  He is protecting his airway.



LABORATORY DATA:  CBC reveals a white blood cell count of 22.8, 77% 
neutrophils.  Basic metabolic panel shows a sodium of 150, potassium 4.4, CO2 
of 17, anion gap 28, glucose is 125.  Troponin is negative.  CK is 1151.  CK-MB 
fraction is 31.2.  Urine drug screen is pending. 



EKG in the emergency department after his arrest shows normal sinus rhythm with 
ST elevation in V2 and V3, slightly prolonged QT interval with a QTc of 478. 



Chest x-ray performed in the emergency department shows no acute 
cardiopulmonary abnormalities.  Specifically, there is no evidence of 
pneumothorax, infiltrate, or pleural effusion.  His heart size appears normal.



ASSESSMENT AND PLAN:  The patient is a 42-year-old male with history of 
intravenous drug abuse who presents to the emergency department in an agitated 
state and proceeded to suffer a respiratory arrest with return of spontaneous 
circulation. 



# Cardiac arrest:  This may have been a primary respiratory event versus a 
coronary event.  It is possible he had vasospasm from intravenous drug abuse.  
Also consider cardiac arrhythmia.  Unfortunately, he was not monitored at the 
time of the event.  He had return of spontaneous circulation within 30-60 
seconds.  Now with stable vital signs.  Initial troponin is negative, CK-MG 
elevated.  We will repeat troponin in 4 hours.  Initial EKG with anterior ST 
elevation vs repolarization abnormality.  Repeat EKG is pending.  STAT echo 
ordered to evaluate for wall motion abnormality.  Seems unlikely he suffered 
acute coronary plaque rupture, though again he may have had a myocardial 
infarction in the setting of coronary vasospasm and intravenous drug use.  He 
will be admitted to ICU on cardiac telemetry.  Case discussed with Dr. Luke.  



# Acute agitation.  I suspect this is drug induced.  Drug screen is pending.  
He is currently sedated after receiving 10 mg of IV Haldol and 2 mg of IV 
Ativan.  He is on an M1 hold and will be monitored in the ICU.  He will require 
behavioral health assessment once medically cleared.  PRN Haldol ordered.



# Hypernatremia.  He has a free water deficit of 4 L.  We will start D5W at 100 
an hour to replace half this in the next 24 hours and follow his sodium levels, 
with a repeat basic metabolic panel this evening.



# Anion gap metabolic acidosis.  His presenting CO2 is 17.  This may be 
consistent with volume depletion or starvation ketosis.  Also consider seizure, 
given the dried blood around his mouth, though I do not see any obvious oral 
mucosal injury.  IV fluids as above.  We will place him on seizure precautions.
  PRN Ativan for any seizure activity.



# Leukocytosis.  I suspect this is a stress response.  There are no localizing 
infectious symptoms.  His chest x-ray is clear.  We will hydrate and recheck 
this in the morning.



# Prolonged QT interval.  He will be monitored on telemetry and avoid QT 
prolonging agents.



# History of IV drug abuse.  Urine drug screen is pending.  I suspect drug 
intoxication likely contributed to his presentation.  Case Management and 
Behavioral Health will be involved to provide resource counseling.



# Code status.  Patient is full code.



# DVT prophylaxis.  Patient is low risk.  We will place SCDs for now.



# Disposition:  Patient is admitted to inpatient status.  I anticipate greater 
than 48 hours hospitalization for stabilization of his acute issues as 
described above.





Job #:  [id number]


MTDD

## 2018-03-06 NOTE — ECHO
[email or web address]





[address] [address] 

Main: [phone] 



Fax: 



Transthoracic Echocardiogram 

Name:             NILESH ZUNIGA                       MR#:

[id number]

Study Date:       2018                            Study Time:

02:40 PM

YOB: 1975                            Age:

42 year(s)

Height:           185.4 cm (73 in.)                     Weight:

95.26 kg (210 lb.)

BSA:              2.2 m2                                Gender:

Male

Examination:      Echo                                  Indication:

Respiratory Arrest, Methaphetamine abuse

Image Quality:                                          Contrast: 

Requested by:     Kelly Cushing                         BP:

138 mmHg/87 mmHg

Heart Rate:                                             Rhythm:

Normal sinus rhythm

Indication:       Respiratory Arrest, Methaphetamine abuse 



Procedure Staff 

Ultrasound Technician:   Zachary Elise RD 

Reading Physician:       Lionel Luke MD 

Requesting Provider: 



Conclusions:           Normal size left ventricle.  

No LV hypertrophy.  

Normal global systolic LV function.  

EF is 59 %.  

No regional wall motion abnormality.  

Normal size right ventricle.  

The left atrium is normal in size.  

The right atrium is normal in size.  

The mitral valve is normal in appearance and function.  

The aortic valve is normal in appearance and function.  

Mild tricuspid regurgitation is present.  

No tricuspid valve vegetation.  

Pulmonary valve not well visualized.  

No pericardial effusion. 



Measurements: 

Chambers                    Valvular Assessment AV/MV

Valvular Assessment TV/PV



Normal                                   Normal

Normal

Name         Value     Range              Name         Value Range

Name           Value Range

Ao Lottie (MM): 4.2 cm    (2.2 cm-3.7            AV Vmax:     1.48 m/s (1

m/s-1.7        TR Vmax:       2.93 mm/s ( - )



cm)                                  m/s)              TR PGmax:

34 mmHg ( - )

IVSd (2D):   1.1 cm (0.6 cm-1.1               AV maxP mmHg ( -

)           syst. PAP: 39 mmHg ( - )



cm)                LVOT Vmax:   1.09 m/s (0.7 m/s-1.1      PV Vmax:

1.29 m/s (0.6 m/s-0.9

LVDd (2D):   5.7 cm    (4.2 cm-5.9                              m/s)

m/s)



cm)                MV E Vmax:   0.99 m/s ( - )         PV PGmax:

7  mmHg ( - )

LVDs (2D):   3.9 cm    (2.1 cm-4              MV A Vmax:   0.77 m/s (

- )



cm)                MV E/A:      1.29 ( - )  

LVPWd (2D):  1.2 cm    (0.6 cm-1 



cm)   

LVEF (2D):   59        (>=54 %)   



Patient: NILESH ZUNIGA                      MRN: [id number]

Study Date: 2018   Page 1 of 2

02:40 PM 









Continued Measurements: 

Chambers                     Valvular Assessment AV/MV

Valvular Assessment TV/PV



Name                      Value           Name

Value     Name                     Value

LADs:                   3.6 cm                MV E/E' Septal:

14.10      CVP (est.):             5 mmHg

LADs Lon.0 cm                MV E/E' Lateral:

13.70

LA Area:                17.5 cm2   



Findings:              Left Ventricle: 

Normal size left ventricle. No LV hypertrophy. Normal global systolic

LV function. EF is 59 %. No

regional wall motion abnormality. Normal diastolic LV function.  

Right Ventricle: 

Normal size right ventricle.  

Left Atrium: 

The left atrium is normal in size.  

Right Atrium: 

The right atrium is normal in size.  

Mitral Valve: 

The mitral valve is normal in appearance and function.  

Aortic Valve: 

The aortic valve is normal in appearance and function.  

Tricuspid Valve: 

The tricuspid valve appears normal. Mild tricuspid regurgitation is

present. No tricuspid valve

vegetation.  

Pulmonic Valve: 

Pulmonary valve not well visualized.  

Aorta: 

The aorta is normal.  

Pericardium: 

No pericardial effusion. 







Electronically signed by Lionel Luke MD on 2018 at 04:32 PM 

(No Signature Object) 



Patient: NILESH ZUNIGA                      MRN: [id number]

Study Date: 2018   Page 2 of 2

02:40 PM 







[id number]

## 2018-03-06 NOTE — EDPHY
H & P


Stated Complaint: M1


Time Seen by Provider: 03/06/18 12:38


HPI/ROS: 





CHIEF COMPLAINT:  Agitation, running in traffic, history of methamphetamine 

abuse and schizophrenia





HISTORY OF PRESENT ILLNESS:  The patient is well known to the emergency 

department is brought in by police with agitation and reported history of using 

methamphetamine.  He was running in traffic.  The patient arrives diaphoretic, 

combative and is unable to provide any history.  Shortly after arrival while in 

the psychiatric stone the patient was witnessed to have a respiratory arrest.  

He became cyanotic pale and did not have a palpable pulse.





REVIEW OF SYSTEMS:


A comprehensive 10 point review is unobtainable secondary to his altered mental 

status


Source: Police, Old records


Exam Limitations: Physical impairment





- Personal History


Current Tetanus/Diphtheria Vaccine: Unsure


Current Tetanus Diphtheria and Acellular Pertussis (TDAP): Unsure


Tetanus Vaccine Date: < 10 years





- Medical/Surgical History


Hx Asthma: No


Hx Chronic Respiratory Disease: No


Hx Diabetes: No


Hx Cardiac Disease: No


Hx Renal Disease: No


Hx Cirrhosis: No


Hx Alcoholism: Yes


Hx HIV/AIDS: No


Hx Splenectomy or Spleen Trauma: No


Other PMH: PMH: ADHD, depression, bipolar, schizoaffective d/o.  PSH:





- Social History


Smoking Status: Current some day smoker





- Physical Exam


Exam: 





General Appearance:  Initially agitated however became apneic shortly after 

arrival


Eyes:  Pupils equal and round no pallor or injection


ENT, Mouth:  Mucous membranes moist


Respiratory:  There are no retractions, lungs are clear to auscultation


Cardiovascular:  Regular rate and rhythm


Gastrointestinal:  Abdomen is soft and nontender, no masses, bowel sounds normal


Neurological:  Grossly was moving all 4 extremities


Skin:  Warm and dry, no rashes


Musculoskeletal:  Neck is supple nontender


Extremities:  symmetrical, full range of motion


Psychiatric:  Agitated, psychotic


Constitutional: 


 Initial Vital Signs











Temperature (C)  38.1 C   03/06/18 12:00


 


Heart Rate  91   03/06/18 12:00


 


Respiratory Rate  20   03/06/18 12:00


 


Blood Pressure  146/90 H  03/06/18 12:00


 


O2 Sat (%)  95   03/06/18 12:00








 











O2 Delivery Mode               Nasal Cannula


 


O2 (L/minute)                  2














Allergies/Adverse Reactions: 


 





No Known Allergies Allergy (Verified 12/09/17 16:07)


 








Home Medications: 














 Medication  Instructions  Recorded


 


Adderall 10 mg Tablet  05/11/16


 


Clotrimazole 1% [Lotrimin 1%] 15 gm TP BID #15 gr 05/11/16


 


Clotrimazole 1% [Lotrimin 1%] 1 layton TP BID #30 cream 06/17/16


 


Ibuprofen [Motrin (*)] 800 mg PO Q6 #15 tab 06/17/16


 


Risperidone 1 mg PO HS #10 tablet 09/21/16














Medical Decision Making





- Diagnostics


EKG Interpretation: 





EKG:  Complete interpretation has been separately recorded in the [facility] 

archive.  Summary impression:  Sinus rhythm, rate 97, nonspecific ST T wave 

changes noted





Repeat EKG:  Sinus rhythm, borderline QT prolongation, changes consistent with 

LVH noted, no arrhythmia.


Imaging Results: 





Chest x-ray AP:  Images reviewed by myself, negative for cardiomegaly or focal 

infiltrate.








ED Course/Re-evaluation: 





The patient presents to the ED with acute psychosis from presumed amphetamine 

abuse.  Shortly after arrival, prior to receiving any chemical restraints the 

patient developed a respiratory arrest the characterized by lack of pulse, 

apnea and cyanosis.





The patient was transferred to the resuscitation room and prior to being placed 

on a cardiac monitor developed return of spontaneous circulation.  The patient 

then became quite agitated.  He received Haldol and Ativan for chemical 

restraint.





The patient's EKG demonstrated no evidence of an obvious arrhythmia.  He does 

not have evidence of a myocardial infarction.





The patient's chest x-ray demonstrates no obvious disease or cardiomegaly.





The patient will require admission to the hospital for observation in the 

setting of his respiratory arrest.  The patient is currently on an M1 

psychiatric hold secondary to his abnormal behavior, schizoaffective disorder 

and reported drug abuse.





The patient will be admitted to the intensive care unit.  I discussed the case 

with Dr. Castro who will admit the patient.





The patient was noted to be hypernatremic.  He received 2 L of normal saline.  

The patient's initial CPKs 1100.





 


Differential Diagnosis: 





Differential diagnosis considered includes ventricular tachycardia, PEA arrest, 

hyperkalemia, respiratory arrest, rhabdomyolysis, aspiration


Critical Care Time: 





Critical care time exclusive of procedures and exclusive of the PA's time was 

32 minutes, performed by myself, Kofi Pabon MD.  The patient presents to 

the ED with acute agitation and subsequent respiratory arrest.  The patient 

required immediate attention was moved into resuscitation room.  The patient 

will require admission to the intensive care unit for close observation.  

Consultation was made with the ICU team and curbside consultation was made with 

Cardiology service.





- Data Points


Laboratory Results: 


 Laboratory Results





 03/06/18 12:10 





 03/06/18 12:10 





 











  03/06/18 03/06/18 03/06/18





  12:10 12:10 12:10


 


WBC      22.78 10^3/uL H 10^3/uL





     (3.80-9.50) 


 


RBC      4.73 10^6/uL 10^6/uL





     (4.40-6.38) 


 


Hgb      14.6 g/dL g/dL





     (13.7-17.5) 


 


Hct      44.2 % %





     (40.0-51.0) 


 


MCV      93.4 fL fL





     (81.5-99.8) 


 


MCH      30.9 pg pg





     (27.9-34.1) 


 


MCHC      33.0 g/dL g/dL





     (32.4-36.7) 


 


RDW      12.5 % %





     (11.5-15.2) 


 


Plt Count      241 10^3/uL 10^3/uL





     (150-400) 


 


MPV      11.0 fL fL





     (8.7-11.7) 


 


Neut % (Auto)      77.0 % H %





     (39.3-74.2) 


 


Lymph % (Auto)      15.8 % %





     (15.0-45.0) 


 


Mono % (Auto)      6.5 % %





     (4.5-13.0) 


 


Eos % (Auto)      0.0 % L %





     (0.6-7.6) 


 


Baso % (Auto)      0.3 % %





     (0.3-1.7) 


 


Nucleat RBC Rel Count      0.0 % %





     (0.0-0.2) 


 


Absolute Neuts (auto)      17.53 10^3/uL H 10^3/uL





     (1.70-6.50) 


 


Absolute Lymphs (auto)      3.59 10^3/uL H 10^3/uL





     (1.00-3.00) 


 


Absolute Monos (auto)      1.48 10^3/uL H 10^3/uL





     (0.30-0.80) 


 


Absolute Eos (auto)      0.01 10^3/uL L 10^3/uL





     (0.03-0.40) 


 


Absolute Basos (auto)      0.07 10^3/uL 10^3/uL





     (0.02-0.10) 


 


Absolute Nucleated RBC      0.00 10^3/uL 10^3/uL





     (0-0.01) 


 


Immature Gran %      0.4 % %





     (0.0-1.1) 


 


Immature Gran #      0.10 10^3/uL 10^3/uL





     (0.00-0.10) 


 


Sodium    150 mEq/L H mEq/L  





    (135-145)  


 


Potassium    4.4 mEq/L mEq/L  





    (3.5-5.2)  


 


Chloride    105 mEq/L mEq/L  





    ()  


 


Carbon Dioxide    17 mEq/l L mEq/l  





    (22-31)  


 


Anion Gap    28 mEq/L H mEq/L  





    (8-16)  


 


BUN    15 mg/dL mg/dL  





    (7-23)  


 


Creatinine    1.2 mg/dL mg/dL  





    (0.7-1.3)  


 


Estimated GFR    > 60   





    


 


Glucose    125 mg/dL H mg/dL  





    ()  


 


Calcium    10.0 mg/dL mg/dL  





    (8.5-10.4)  


 


Creatine Kinase  1151 IU/L H IU/L    





   (0-224)   


 


CK-MB (CK-2) Fraction  31.20 ng/mL H ng/mL    





   (0.00-3.19)   


 


CK-MB (CK-2) %  2.7 % %    





   (0.0-4.0)   


 


Creatine Kinase Interp  NEGATIVE     





   (NEGATIVE)   


 


Troponin I    < 0.012 ng/mL ng/mL  





    (0.000-0.034)  











Medications Given: 


 





Dextrose (D5w)  1,000 mls @ 100 mls/hr IV CONT LUKE


   Stop: 09/02/18 13:14


   Last Admin: 03/06/18 13:47 Dose:  1,000 mls





Discontinued Medications





Haloperidol Lactate (Haldol Injection)  10 mg IVP EDNOW ONE


   Stop: 03/06/18 12:12


   Last Admin: 03/06/18 12:12 Dose:  10 mg


Sodium Chloride (Ns)  1,000 mls @ 0 mls/hr IV EDNOW ONE; Wide Open


   PRN Reason: Protocol


   Stop: 03/06/18 12:54


   Last Admin: 03/06/18 13:12 Dose:  Not Given


Sodium Chloride (Ns)  1,000 mls @ 0 mls/hr IV EDNOW ONE; Wide Open


   PRN Reason: Protocol


   Stop: 03/06/18 12:54


   Last Admin: 03/06/18 13:13 Dose:  Not Given


Lorazepam (Ativan Injection)  2 mg IVP EDNOW ONE


   Stop: 03/06/18 12:12


   Last Admin: 03/06/18 12:12 Dose:  2 mg








Departure





- Departure


Disposition: [facility]


Clinical Impression: 


 Polysubstance abuse, Schizoaffective disorder, Respiratory arrest, 

Hypernatremia





Condition: Fair

## 2018-03-06 NOTE — PDMN
Medical Necessity


Medical necessity: est los>2mn for respiratory/ cardiac arrest, drug induced 

agitation , hypernatremia, anion gap metabolic acidosis, leukocytosis, and 

prolonged QT interval; admit to ICU, M1 hold; comorbid  schizophrenia, IV meth 

use, ADHD, ? bipolar; per order and HP 3/6/18

## 2018-03-07 LAB — PLATELET # BLD: 138 10^3/UL (ref 150–400)

## 2018-03-07 RX ADMIN — DEXTROSE MONOHYDRATE AND SODIUM CHLORIDE SCH MLS: 5; .45 INJECTION, SOLUTION INTRAVENOUS at 06:03

## 2018-03-07 NOTE — GCON
[f rep st]



                                                                    CONSULTATION





PULMONARY/CRITICAL CARE CONSULTATION



DATE OF CONSULTATION:  03/06/2018



REASON FOR CONSULTATION:  Drug overdose.



HISTORY:  The patient is a 42-year-old, who apparently took methamphetamine today.  He was found runn
ing chasing cars in traffic.  He was brought in by the police and was severely agitated and combative
 in the emergency department.  He was diaphoretic and unable to provide any history.  He was apparent
ly spitting blood at attendants in the emergency department.  He was placed in restraints.  He had a 
brief respiratory arrest associated with his struggles, and apparently lost pulses briefly.  CPR was 
initiated, but was not needed longer than for a few compressions.  He was given a large dose of Haldo
l after he had good pulse and blood pressure and again became agitated.  He was also given Ativan.  E
KG remained stable.  He was admitted to the intensive care unit and has been somnolent since.



PAST MEDICAL HISTORY:  Unobtainable at this time.  According to the chart, there is a history of poss
ible schizophrenia, IV drug abuse, possibly bipolar depression, and possibly ADHD.



FAMILY HISTORY:  Unobtainable.



SOCIAL HISTORY:  Unobtainable.  Tobacco in the past.  IV drug abuse, marijuana, alcohol unknown.



REVIEW OF SYSTEMS:  Unobtainable.



PHYSICAL EXAMINATION:  GENERAL:  Reveals a somewhat thin gentleman, who is unresponsive secondary to 
sedation.  VITAL SIGNS:  Blood pressure is 137/85, heart rate 84 with sinus rhythm on the monitor.  O
n 2 L of oxygen, saturations are 96%.  HEENT:  Shows small equal pupils.  There is a small amount of 
blood around the nose, upper lip, and mouth without any active bleeding.  Mucous membranes are dry.  
NECK:  There is no jugular venous distention.  CHEST:  Clear bilaterally.  No rales or consolidation 
are noted.  HEART:  Regular in rate and rhythm without significant murmur or gallop.  ABDOMEN:  Soft.
  Bowel sounds are present, but diminished.  There is no obvious tenderness.  EXTREMITIES:  There is 
no edema; no cords.  The extremities are without significant lesions.  SKIN:  Without rash.  NEUROLOG
IC:  Remarkable for somnolence.  Obviously, he is moving all extremities, was combative, etc., in the
 emergency department.



DATABASE:  Chest x-ray on admission was unremarkable.  There were no infiltrates.  White blood cell c
ount is 22,000, hematocrit 44, platelets 241,000.  Basic metabolic panel is remarkable for a sodium o
f 150, CO2 of 17 with an anion gap of 28.  BUN is 15 with creatinine 1.2, glucose 125.  Total CPK is 
1151.  Troponins are negative.  Urine tox screen is positive for amphetamines and THC.



ASSESSMENT:  

1.  Drug overdose.  Amphetamines involved.  He was significantly delirious, agitated, and combative o
n transport into the hospital and in the emergency department.  He has received Haldol and Ativan and
 is currently quite sedated.  Agitation may again become an issue.  Ativan, Haldol, and Precedex if n
eeded have been ordered.

2.  M1 hold secondary to #1.

3.  Hypernatremia.  Sodium is 150.  Intravenous fluids will be given and sodium rechecked.  

4.  Delirium secondary to #1.  Restraints and sedative drugs will be used as needed to control his de
lirium/agitation/combativeness as needed.  We need to try to keep him safe, as well as the nursing pe
adan and others caring for him.



PLAN AND RECOMMENDATIONS:  The patient will be kept in the intensive care unit on an M1 hold.  Haldol
, Ativan, and Precedex if needed will be used for increasing agitation and delirium.  Intravenous flu
ids will be continued.  Laboratory will be followed.  



Further plans and recommendations will be made based on his progress over the next 12-24 hours.  Furt
her history will be obtained once he wakes up and is cooperative.





Job #:  [id number]

## 2018-03-07 NOTE — HOSPPROG
Hospitalist Progress Note


Assessment/Plan: 





* cardiac arrest?/respiratory


* brief d/t methamphetamine





* hypokalemia


* replete


* recheck tomorrow





*amphetamine use


* not suicidal


* dc m1 hold





*dispo


* probably home tomorrow


 


Subjective: no new complaints.  feeling pretty tired


Objective: 


 Vital Signs











Temp Pulse Resp BP Pulse Ox


 


 36.7 C   60   12   114/71   94 


 


 03/07/18 12:00  03/07/18 12:00  03/07/18 12:00  03/07/18 12:00  03/07/18 12:00








 Laboratory Results





 03/07/18 05:40 





 03/07/18 08:35 





 











 03/06/18 03/07/18 03/08/18





 05:59 05:59 05:59


 


Intake Total  3607.3 


 


Output Total  950 


 


Balance  2657.3 














- Physical Exam


Constitutional: no apparent distress, appears nourished, not in pain


Eyes: anicteric sclera, EOMI


Ears, Nose, Mouth, Throat: moist mucous membranes, hearing normal


Cardiovascular: regular rate and rhythym, no murmur, rub, or gallop


Respiratory: no respiratory distress


Gastrointestinal: normoactive bowel sounds, soft, non-tender abdomen, no 

palpable masses


Skin: warm


Neurologic: AAOx3


Psychiatric: interacting appropriately, not anxious, not encephalopathic, 

thought process linear





ICD10 Worksheet


Patient Problems: 


 Problems











Problem Status Onset


 


Hypernatremia Acute  


 


Polysubstance abuse Acute  


 


Respiratory arrest Acute  


 


Schizoaffective disorder Acute

## 2018-03-07 NOTE — ASMTCASEMG
Living Arrangements

 

What is your living           Answers:  Alone                                 

arrangement? Who do you                                                       

live with?                                                                    

Type Of Residence

 

What kind of residence do     Answers:  Homeless                              

you live in?                                                                  

Discharge Plan Comments

 

Coordination Status           

Comments                      

Notes:

Patient is a 41yo single male with a hx of schizoaffective and possible Bipolar Disorder, who was 

found in an agitated state running through traffic. Patient also has a hx of IV methamphetamine 

abuse and tested positive for amphetamines. Patient was placed on an M1 hold and brought to the 

ER. Patient was witnessed to have a respiratory arrest while in the ER, with spontaneous return of 

circulation. He was admitted for cardiac arrest, acute agitation, hypernatremia,, anion gap 

acidosis, leukocytosis and hx of drug abuse. Patient is homeless and well known to the hospital. No 


therapies have been ordered.Patient will likely need a psych evaluation by P since he is 

Medicaid. D/C plan TBD. CM will follow.

 

Date Signed:  03/07/2018 09:58 AM

Electronically Signed By:Pastora Daniel LCSW

## 2018-03-08 VITALS
DIASTOLIC BLOOD PRESSURE: 76 MMHG | OXYGEN SATURATION: 98 % | HEART RATE: 71 BPM | SYSTOLIC BLOOD PRESSURE: 99 MMHG | TEMPERATURE: 97.3 F

## 2018-03-08 VITALS — RESPIRATION RATE: 16 BRPM

## 2018-03-08 NOTE — GDS
[f rep st]



                                                             DISCHARGE SUMMARY





DISCHARGE DIAGNOSES:  

1.  Methamphetamine intoxication with brief respiratory arrest during very agitated moment in the [facility] department.

2.  History of schizophrenia versus schizoaffective disorder.  

3.  History of positive IV drug use.  

4.  History of bipolar.



HISTORY:  This is a 42-year-old male who was brought to the emergency department by the police after 
he was found agitated and running through traffic.  In the emergency department, he was very agitated
 and in handcuffs and was being physically restrained when he became cyanotic.  Code blue was called,
 but he had spontaneous return of circulation.



HOSPITAL COURSE:  The patient was admitted and was monitored in the ICU.  There were no arrhythmias n
oted.  He did get medications for his agitation.  This improved and, by the time of discharge, he was
 alert and oriented.  He denies any suicidal ideation or homicidal ideation.  He is being discharged 
home.  His M1 hold was lifted.





Job #:  [id number]

## 2018-03-08 NOTE — ASMTCMCOM
CM Note

 

CM Note                       

Notes:

Pt ready for DC today. Spoke with pt who stated if he cannot stay with friends tonight, he knows 

which [employer] offers shelter tonight. Pt given clothes and a bus pass. 

 

Date Signed:  03/08/2018 02:36 PM

Electronically Signed By:Gabrielle Sorensen LCSW

## 2018-03-08 NOTE — ASMTLACE
LACE

 

Length of stay for            Answers:  2 days                                

current admission                                                             

Acuity / Level of             Answers:  Yes                                   

Care: Did the patient                                                         

have an inpatient                                                             

admission?                                                                    

# of Emergency department     Answers:  3-4                                   

visits in the last 6                                                          

months                                                                        

Social determinants           Answers:  History of substance                  

                                        abuse (ETOH, street                   

                                        drugs, prescription                   

                                        drugs, etc.)                          

                                        Homelessness                          

                                        (street, shelter)                     

                                        Mental health diagnosis               

                                        (anxiety, depression, pers            

                                        onality disorders, etc.)              

                                        Lack of community                     

                                        resources and/or lack of              

                                        social support (no                    

                                        pcp, lives                            

                                        alone, transportation, mike            

                                        d)                                    

Score: 21

 

Date Signed:  03/08/2018 02:34 PM

Electronically Signed By:Gabrielle Sorensen LCSW

## 2018-03-09 ENCOUNTER — HOSPITAL ENCOUNTER (EMERGENCY)
Dept: HOSPITAL 80 - FED | Age: 43
Discharge: HOME | End: 2018-03-09
Payer: MEDICAID

## 2018-03-09 VITALS
DIASTOLIC BLOOD PRESSURE: 80 MMHG | OXYGEN SATURATION: 97 % | TEMPERATURE: 98.6 F | HEART RATE: 57 BPM | SYSTOLIC BLOOD PRESSURE: 135 MMHG

## 2018-03-09 VITALS — RESPIRATION RATE: 16 BRPM

## 2018-03-09 DIAGNOSIS — F17.200: ICD-10-CM

## 2018-03-09 DIAGNOSIS — Z79.82: ICD-10-CM

## 2018-03-09 DIAGNOSIS — I80.8: Primary | ICD-10-CM

## 2018-03-09 NOTE — EDPHY
H & P


Smoking Status: Current some day smoker


Time Seen by Provider: 03/09/18 11:13


HPI/ROS: 





CHIEF COMPLAINT: "I think my IV site is infected"





HISTORY OF PRESENT ILLNESS:  42-year-old male history of homelessness, IV drug 

use, discharged from the hospital yesterday for acute psychosis with brief 

episode of apnea with placement of IV in the right antecubital fossa, walked to 

the ER complaining of erythema to the right antecubital fossa.  He states that 

this is from hospital placement of IV not out of hospital recreational IV drug 

use.  No fever no chills.  No lymphangitic streaking.  No chest pain.  No 

dyspnea.  No fever or chills.  No flu-like symptoms








REVIEW OF SYSTEMS:


A ten point review of systems was performed and is negative with the exception 

of the items mentioned in the HPI








PAST MEDICAL & SURGICAL  HISTORY:  No pertinent medical or surgical history    





SOCIAL HISTORY: History of IV methamphetamine use.  Homelessness.    














************


PHYSICAL EXAM





(Prior to examination, patient consented to physical exam, hands were washed 

and my usual and customary physical exam procedures followed)


1) GENERAL:  Sleeping, easily woken, smiling, appears to be in no acute 

distress.


2) HEAD: Normocephalic, atraumatic


3) HEENT: Pupils equal, round, reactive to light bilaterally.  Sclera 

anicteric. 


4) NECK: Full range of motion, no meningeal signs.


5) LUNGS: Clear auscultation bilaterally, no wheezes, no rhonchi, no 

retractions.   


6) HEART: Regular rate and rhythm, no murmur, no heave, no gallop.


7) ABDOMEN: No guarding, no rebound, no focal tenderness


8) MUSCULOSKELETAL:  Right antecubital fossa:  Erythema along the vasculature 

extending from the puncture wound 3 cm proximally.  There is no crepitus.  No 

induration.  No lymphangitic streaking.  No axillary or epitrochlear adenopathy 

or tenderness.


9) BACK: No visual or palpable abnormality. 


10) SKIN: No rash, no petechiae. 


11) Psychiatric:  Patient is oriented X 3, there is no agitation.








***************





DIFFERENTIAL DIAGNOSIS:  In no particular order including but limited to 

cellulitis, abscess, necrotizing fasciitis, superficial phlebitis, DVT   


 (Milan,D Connie)


Constitutional: 





 Initial Vital Signs











Temperature (C)  36.9 C   03/09/18 10:31


 


Heart Rate  80   03/09/18 10:31


 


Respiratory Rate  16   03/09/18 10:31


 


Blood Pressure  139/77 H  03/09/18 10:31


 


O2 Sat (%)  93   03/09/18 10:31








 











O2 Delivery Mode               Room Air














Allergies/Adverse Reactions: 


 





No Known Allergies Allergy (Verified 12/09/17 16:07)


 








Home Medications: 














 Medication  Instructions  Recorded


 


Aspirin [Aspirin 325 mg (*)] 325 mg PO DAILY PRN 03/07/18














MDM/Departure





- [facility]


Imaging Results: 


Images reviewed myself (NOHELIA Yates)


ED Course/Re-evaluation: 





11:20 a.m.:  Reviewed old medical records including recent hospitalization.  He 

currently appears well.  Doubt necrotizing fasciitis.  Will obtain ultrasound 

of the right upper extremity to evaluate for possible abscess and/or DVT.  Care 

of patient under supervision of secondary supervising physician Dr Edda Yoon

  with whom I discussed case. 





1:26 p.m.:  Re-evaluation, discussed his imaging showing thrombophlebitis.  We 

discussed warm packs.  Discussed the importance of avoiding IV drug use.  He 

will be discharged with usual customary discharge precautions instructions. (

NOHELIA Yates)





The patient was evaluated and managed by the Physician Assistant.  My co-

signature indicates that I have reviewed this chart and I agree with the 

findings and plan of care as documented.  I am the secondary supervising 

physician. (Edda Yoon)





- Depart


Disposition: Home, Routine, Self-Care


Clinical Impression: 


 Thrombophlebitis of right arm





Condition: Good


Instructions:  Superficial Thrombophlebitis (ED)


Additional Instructions: 


Keep warm packs applied to the area 3-5 times per day.  Stop using IV drugs.


Referrals: 


[facility],. [Clinic] - 2-3 days, call for appt.

## 2018-03-24 ENCOUNTER — HOSPITAL ENCOUNTER (EMERGENCY)
Dept: HOSPITAL 80 - FED | Age: 43
LOS: 1 days | Discharge: HOME | End: 2018-03-25
Payer: MEDICAID

## 2018-03-24 DIAGNOSIS — Z79.82: ICD-10-CM

## 2018-03-24 DIAGNOSIS — F15.10: Primary | ICD-10-CM

## 2018-03-24 DIAGNOSIS — F17.200: ICD-10-CM

## 2018-03-24 PROCEDURE — G0480 DRUG TEST DEF 1-7 CLASSES: HCPCS

## 2018-03-25 VITALS — HEART RATE: 102 BPM | SYSTOLIC BLOOD PRESSURE: 168 MMHG | DIASTOLIC BLOOD PRESSURE: 105 MMHG

## 2018-03-25 VITALS — RESPIRATION RATE: 18 BRPM | OXYGEN SATURATION: 96 %

## 2018-03-25 VITALS — TEMPERATURE: 99.1 F

## 2018-03-25 LAB
CK SERPL-CCNC: 247 IU/L (ref 0–224)
PLATELET # BLD: 237 10^3/UL (ref 150–400)

## 2018-03-25 NOTE — EDPHY
H & P


Time Seen by Provider: 03/25/18 00:02


HPI/ROS: 





HPI





CHIEF COMPLAINT:  Methamphetamine abuse





HISTORY OF PRESENT ILLNESS:  This patient is a 42-year-old male, failure milieu 

to myself as well as the emergency room presents emergency room after doing 

methamphetamine.  He is homeless.  He has a history of bipolar disorder, 

attention deficit hyperactivity disorder, he arrives by EMS in 4 point 

restraints for somewhat agitated.  Police at bedside.  He admits to doing large 

amount of methamphetamine today.  Denies suicidal ideation.





Past Medical History:  Bipolar disorder, attention deficit hyperactivity 

disorder, schizoaffective disorder, methamphetamine abuse, brief respiratory 

arrest in the emergency room





Past Surgical History:  Denies recent surgical history





Social History:  Endorses methamphetamine.  Homeless





Family History:  Noncontributory








ROS   


REVIEW OF SYSTEMS:


A comprehensive 10 point review of systems is otherwise negative aside from 

elements mentioned in the history of present illness.








Exam   


Constitutional   somewhat agitated, triage nursing summary reviewed, vital 

signs reviewed, awake/alert. 


Eyes   normal conjunctivae and sclera, EOMI, PERRLA. 


HENT   normal inspection, atraumatic, moist mucus membranes, no epistaxis, neck 

supple/ no meningismus, no raccoon eyes. 


Respiratory   clear to auscultation bilaterally, normal breath sounds, no 

respiratory distress, no wheezing. 


Cardiovascular   rate normal, regular rhythm, no murmur, no edema, distal 

pulses normal. 


Gastrointestinal   soft, non-tender, no rebound, no guarding, normal bowel 

sounds, no distension, no pulsatile mass. 


Genitourinary   no CVA tenderness. 


Musculoskeletal  no midline vertebral tenderness, full range of motion, no calf 

swelling, no tenderness of extremities, no meningismus, good pulses, 

neurovascularly intact.


Skin   pink, warm, & dry, no rash, skin atraumatic. 


Neurologic   awake, alert and oriented x 3, AAOx3, moves all 4 extremities 

equally, motor intact, sensory intact, CN II-XII intact, normal cerebellar, 

normal vision, normal speech. 


Psychiatric   agitated, appears high on meth


Heme/Lymph/Immune   no lymphadenopathy.





Differential Diagnosis:  Includes but is not limited to in a particular order 

methamphetamine abuse, acute agitation, mood disorder, bipolar disorder





Medical Decision Making:  Plan for this patient oral Zyprexa 10 mg. Re-

evaluate.  If continues to be super aggressive or agitated he may require more 

medications.





Re-evaluation:








1250:  Patient still agitated after 10 mg p.o. Zyprexa.  Will establish an IV 

check basic blood work, urine drug screen has been ordered, will give him a mg 

IV Ativan to see if this comes in down.  And then re-evaluate.








0418:  Patient required multiple doses of medication including Zyprexa, Ativan 

to control his agitation.  He is now doing much better and he is calm and 

cooperative.  He is lying in bed resting comfortably.  He denies wanting to 

hurt himself or anybody else.








0436AM:  Patient's urinalysis back does not show any drugs however patient 

still adamant that he did methamphetamine earlier.


Patient relaxing resting comfortably answers my questions appropriately.  Will 

plan on discharge early this morning.








0530:  Patient resting comfortably no acute distress he is eager to be 

discharged.  He is calm and cooperative answers my questions appropriately does 

not appear acutely psychotic her gravely disable.  He denies wanting to hurt 

himself or anybody else.  Ready for discharge.


Source: Patient, Police, EMS





- Personal History


Tetanus Vaccine Date: < 10 years





- Medical/Surgical History


Hx Asthma: No


Hx Chronic Respiratory Disease: No


Hx Diabetes: No


Hx Cardiac Disease: No


Hx Renal Disease: No


Hx Cirrhosis: No


Hx Alcoholism: Yes


Hx HIV/AIDS: No


Hx Splenectomy or Spleen Trauma: No


Other PMH: PMH: ADHD, depression, bipolar, schizoaffective d/o.  PSH:





- Social History


Smoking Status: Current some day smoker


Constitutional: 


 Initial Vital Signs











Temperature (C)  37.3 C   03/25/18 00:00


 


Heart Rate  114 H  03/25/18 00:00


 


Respiratory Rate  22 H  03/25/18 00:00


 


Blood Pressure  174/114 H  03/25/18 00:00


 


O2 Sat (%)  98   03/25/18 00:00








 











O2 Delivery Mode               Room Air














Allergies/Adverse Reactions: 


 





No Known Allergies Allergy (Verified 03/25/18 00:47)


 








Home Medications: 














 Medication  Instructions  Recorded


 


Aspirin [Aspirin 325 mg (*)] 325 mg PO DAILY PRN 03/07/18














Medical Decision Making





- Data Points


Laboratory Results: 


 Laboratory Results





 03/25/18 01:00 





 03/25/18 01:00 





 











  03/25/18 03/25/18 03/25/18





  03:59 01:00 01:00


 


WBC      8.57 10^3/uL 10^3/uL





     (3.80-9.50) 


 


RBC      4.38 10^6/uL L 10^6/uL





     (4.40-6.38) 


 


Hgb      13.5 g/dL L g/dL





     (13.7-17.5) 


 


Hct      39.2 % L %





     (40.0-51.0) 


 


MCV      89.5 fL fL





     (81.5-99.8) 


 


MCH      30.8 pg pg





     (27.9-34.1) 


 


MCHC      34.4 g/dL g/dL





     (32.4-36.7) 


 


RDW      13.1 % %





     (11.5-15.2) 


 


Plt Count      237 10^3/uL 10^3/uL





     (150-400) 


 


MPV      10.3 fL fL





     (8.7-11.7) 


 


Neut % (Auto)      83.8 % H %





     (39.3-74.2) 


 


Lymph % (Auto)      8.8 % L %





     (15.0-45.0) 


 


Mono % (Auto)      6.4 % %





     (4.5-13.0) 


 


Eos % (Auto)      0.1 % L %





     (0.6-7.6) 


 


Baso % (Auto)      0.5 % %





     (0.3-1.7) 


 


Nucleat RBC Rel Count      0.0 % %





     (0.0-0.2) 


 


Absolute Neuts (auto)      7.19 10^3/uL H 10^3/uL





     (1.70-6.50) 


 


Absolute Lymphs (auto)      0.75 10^3/uL L 10^3/uL





     (1.00-3.00) 


 


Absolute Monos (auto)      0.55 10^3/uL 10^3/uL





     (0.30-0.80) 


 


Absolute Eos (auto)      0.01 10^3/uL L 10^3/uL





     (0.03-0.40) 


 


Absolute Basos (auto)      0.04 10^3/uL 10^3/uL





     (0.02-0.10) 


 


Absolute Nucleated RBC      0.00 10^3/uL 10^3/uL





     (0-0.01) 


 


Immature Gran %      0.4 % %





     (0.0-1.1) 


 


Immature Gran #      0.03 10^3/uL 10^3/uL





     (0.00-0.10) 


 


Sodium    146 mEq/L H mEq/L  





    (135-145)  


 


Potassium    4.2 mEq/L mEq/L  





    (3.5-5.2)  


 


Chloride    109 mEq/L mEq/L  





    ()  


 


Carbon Dioxide    24 mEq/l mEq/l  





    (22-31)  


 


Anion Gap    13 mEq/L mEq/L  





    (8-16)  


 


BUN    19 mg/dL mg/dL  





    (7-23)  


 


Creatinine    0.8 mg/dL mg/dL  





    (0.7-1.3)  


 


Estimated GFR    > 60   





    


 


Glucose    120 mg/dL H mg/dL  





    ()  


 


Calcium    9.8 mg/dL mg/dL  





    (8.5-10.4)  


 


Creatine Kinase    247 IU/L H IU/L  





    (0-224)  


 


CK-MB (CK-2) Fraction    5.14 ng/mL H ng/mL  





    (0.00-3.19)  


 


CK-MB (CK-2) %    2.1 % %  





    (0.0-4.0)  


 


Creatine Kinase Interp    NEGATIVE   





    (NEGATIVE)  


 


Urine Opiates Screen  NEGATIVE     





   (NEGATIVE)   


 


Urine Barbiturates  NEGATIVE     





   (NEGATIVE)   


 


Ur Phencyclidine Scrn  NEGATIVE     





   (NEGATIVE)   


 


Ur Amphetamine Screen  NEGATIVE     





   (NEGATIVE)   


 


U Benzodiazepines Scrn  NEGATIVE     





   (NEGATIVE)   


 


Urine Cocaine Screen  NEGATIVE     





   (NEGATIVE)   


 


U Marijuana (THC) Screen  NEGATIVE     





   (NEGATIVE)   


 


Ethyl Alcohol    < 10 mg/dL mg/dL  





    (0-10)  











Medications Given: 


 








Discontinued Medications





Haloperidol Lactate (Haldol Injection)  10 mg IM EDNOW ONE


   Stop: 03/25/18 00:49


   Last Admin: 03/25/18 04:42 Dose:  Not Given


Sodium Chloride (Ns)  1,000 mls @ 0 mls/hr IV ONCE ONE


   PRN Reason: Wide Open


   Stop: 03/25/18 01:36


   Last Admin: 03/25/18 01:36 Dose:  1,000 mls


Sodium Chloride (Ns)  1,000 mls @ 0 mls/hr IV ONCE ONE


   PRN Reason: Wide Open


   Stop: 03/25/18 01:52


   Last Admin: 03/25/18 02:05 Dose:  1,000 mls


Lorazepam (Ativan Injection)  1 mg IVP EDNOW ONE


   Stop: 03/25/18 00:51


   Last Admin: 03/25/18 01:25 Dose:  1 mg


Lorazepam (Ativan Injection)  1 mg IVP EDNOW ONE


   Stop: 03/25/18 00:51


   Last Admin: 03/25/18 01:32 Dose:  1 mg


Olanzapine (Zyprexa Zydis)  10 mg PO EDNOW ONE


   Stop: 03/25/18 00:29


   Last Admin: 03/25/18 00:29 Dose:  10 mg


Olanzapine (Zyprexa Zydis)  10 mg PO EDNOW ONE


   Stop: 03/25/18 02:49


   Last Admin: 03/25/18 03:30 Dose:  10 mg








Departure





- Departure


Disposition: Home, Routine, Self-Care


Clinical Impression: 


 Methamphetamine abuse





Condition: Good


Instructions:  Methamphetamine Abuse (ED)


Referrals: 


NONE *PRIMARY CARE P,. [Primary Care Provider] - As per Instructions